# Patient Record
Sex: FEMALE | Race: WHITE | NOT HISPANIC OR LATINO | Employment: OTHER | URBAN - NONMETROPOLITAN AREA
[De-identification: names, ages, dates, MRNs, and addresses within clinical notes are randomized per-mention and may not be internally consistent; named-entity substitution may affect disease eponyms.]

---

## 2024-05-22 ENCOUNTER — TELEPHONE (OUTPATIENT)
Dept: CARDIOLOGY | Facility: CLINIC | Age: 64
End: 2024-05-22

## 2024-05-22 ENCOUNTER — OFFICE VISIT (OUTPATIENT)
Dept: CARDIOLOGY | Facility: CLINIC | Age: 64
End: 2024-05-22
Payer: COMMERCIAL

## 2024-05-22 VITALS
HEIGHT: 70 IN | HEART RATE: 78 BPM | WEIGHT: 228.4 LBS | SYSTOLIC BLOOD PRESSURE: 108 MMHG | BODY MASS INDEX: 32.7 KG/M2 | DIASTOLIC BLOOD PRESSURE: 72 MMHG

## 2024-05-22 DIAGNOSIS — I10 ESSENTIAL HYPERTENSION: ICD-10-CM

## 2024-05-22 DIAGNOSIS — R07.89 OTHER CHEST PAIN: ICD-10-CM

## 2024-05-22 DIAGNOSIS — R53.83 OTHER FATIGUE: Primary | ICD-10-CM

## 2024-05-22 DIAGNOSIS — R06.02 SHORTNESS OF BREATH: ICD-10-CM

## 2024-05-22 PROBLEM — R07.9 CHEST PAIN: Status: ACTIVE | Noted: 2024-05-22

## 2024-05-22 PROCEDURE — 3074F SYST BP LT 130 MM HG: CPT | Performed by: NURSE PRACTITIONER

## 2024-05-22 PROCEDURE — 1036F TOBACCO NON-USER: CPT | Performed by: NURSE PRACTITIONER

## 2024-05-22 PROCEDURE — 3008F BODY MASS INDEX DOCD: CPT | Performed by: NURSE PRACTITIONER

## 2024-05-22 PROCEDURE — 3078F DIAST BP <80 MM HG: CPT | Performed by: NURSE PRACTITIONER

## 2024-05-22 PROCEDURE — 99213 OFFICE O/P EST LOW 20 MIN: CPT | Performed by: NURSE PRACTITIONER

## 2024-05-22 RX ORDER — ESTRADIOL 1 MG/1
1 TABLET ORAL
COMMUNITY

## 2024-05-22 RX ORDER — OMEPRAZOLE 40 MG/1
40 CAPSULE, DELAYED RELEASE ORAL
COMMUNITY

## 2024-05-22 RX ORDER — OLMESARTAN MEDOXOMIL 20 MG/1
20 TABLET ORAL DAILY
COMMUNITY

## 2024-05-22 RX ORDER — CITALOPRAM 10 MG/1
10 TABLET ORAL DAILY
COMMUNITY

## 2024-05-22 RX ORDER — ATENOLOL 100 MG/1
100 TABLET ORAL DAILY
Qty: 1 TABLET | Refills: 0 | Status: SHIPPED | OUTPATIENT
Start: 2024-05-22 | End: 2024-05-23

## 2024-05-22 RX ORDER — ALBUTEROL SULFATE 90 UG/1
2 AEROSOL, METERED RESPIRATORY (INHALATION) EVERY 4 HOURS PRN
COMMUNITY
Start: 2023-10-10

## 2024-05-22 RX ORDER — BISMUTH SUBSALICYLATE 262 MG
1 TABLET,CHEWABLE ORAL DAILY
COMMUNITY

## 2024-05-22 RX ORDER — OXYBUTYNIN CHLORIDE 5 MG/1
5 TABLET ORAL DAILY
COMMUNITY
Start: 2022-09-15

## 2024-05-22 RX ORDER — ACETAMINOPHEN 500 MG/1
500 CAPSULE, LIQUID FILLED ORAL EVERY 4 HOURS PRN
COMMUNITY

## 2024-05-22 RX ORDER — HYDROCHLOROTHIAZIDE 25 MG/1
25 TABLET ORAL
COMMUNITY

## 2024-05-22 RX ORDER — LEVOTHYROXINE SODIUM 88 UG/1
88 CAPSULE ORAL DAILY
COMMUNITY

## 2024-05-22 ASSESSMENT — ENCOUNTER SYMPTOMS
IRREGULAR HEARTBEAT: 0
PND: 0
SYNCOPE: 0
NEAR-SYNCOPE: 0
ORTHOPNEA: 0
DYSPNEA ON EXERTION: 1
DIAPHORESIS: 1
PALPITATIONS: 0

## 2024-05-22 NOTE — PATIENT INSTRUCTIONS
Please bring all medicines, vitamins, and herbal supplements with you when you come to the office.    Prescriptions will not be filled unless you are compliant with your follow up appointments or have a follow up appointment scheduled as per instruction of your physician. Refills should be requested at the time of your visit.     PLAN:   Through informed decision making process incorporating patients unique circumstances, the following treatment plan will be initiated:    1.  Prescription drug management of cardiovascular medication for efficacy, adherence to treatment, side effect assessment and polypharmacy. Current treatment clinically warranted and to continue without modifications.    2. Cardiac CTA (chest pain, dyspnea,fatigue)  Take atenolol 100mg the morning of procedure    3. Return for follow-up; in the interim, contact the office if new symptoms arise.  Dr. Jean-Baptiste annual

## 2024-05-22 NOTE — TELEPHONE ENCOUNTER
[id number] APPROVED AUTH# [id number] FOR [facility], VALID 5/22--7/6/24 PER [employer]. SCHEDULED AT  [facility] ON 6/18/24 AT 1:40PM ARRIVAL TIME, I SPOKE WITH PT AND GAVE HER APPT INFO

## 2024-05-22 NOTE — PROGRESS NOTES
"Chief Complaint  \"I am getting her to breath and fatigued\"    Reason for Visit  Add-on  Patient presents to the office today for outpatient follow-up for concerns regarding change in functional capacity.  Last evaluated in clinic by  Jan 2023.     Presents today ambulatory with steady gait.  Accompanied by patient  Patient denies any hospitalizations or significant changes to interval medical history since last office follow-up.     History of Present Illness   Patient is a pleasant 63-year-old who presents the office today with concerns of progressive worsening shortness of breath, exertional diaphoresis and fatigability.    She was initially seen by Dr. Astorga in the Fall of 2022 hospitalization where she ruled out for acute coronary syndrome or acute MI.  In November 2022 perfusion study showed no ischemia, and echocardiogram showed LVEF of 65% with no significant valvular heart disease and RVSP of 26 mmHg.  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  She denies any additional cardiology follow-up.    She presents to the office today where she reports progressive dyspnea on exertion that has been going on probably about 3 months.  She reports fatigability, inability to complete her housework and yard work like she used to.  Describes exertional diaphoresis and episodes of heaviness in her chest.  She reports this changes really unusual for her.  Reports prior pulmonary workup was unremarkable.  Is compliant with CPAP treatment.    Cardiovascular risk profile:  Treated hypertension  Recent lipids with cholesterol 225 and HDL is 70.  Currently on no statin.  Denies diabetes  Non-smoker  Negative family history premature coronary artery disease    Patient reports that overall has no complaint(s) of claudication, irregular heart beat, lower extremity edema, near-syncope, and orthopnea    Discussed concerns regarding dyspnea on exertion, exertional diaphoresis chest pain and " "significant change in overall exercise capacity and functional tolerance in regards to ischemic heart disease.  Discussed testing options of perfusion study, cardiac CTA.  She questions possibility of cardiac catheterization - reviewed  threshold for invasive evaluation and at this time feel she would be best served with cardiac CTA to clearly delineate coronary anatomy.     She reports being told after her WPW ablation to avoid beta-blocker and calcium channel blockers.  No true allergy.    Review of Systems   Constitutional: Positive for diaphoresis.   Cardiovascular:  Positive for chest pain and dyspnea on exertion. Negative for irregular heartbeat, leg swelling, near-syncope, orthopnea, palpitations, paroxysmal nocturnal dyspnea and syncope.        Visit Vitals  /72 (BP Location: Left arm, Patient Position: Sitting)   Pulse 78   Ht 1.778 m (5' 10\")   Wt 104 kg (228 lb 6.4 oz)   BMI 32.77 kg/m²   Smoking Status Never   BSA 2.27 m²     Physical Exam  Vitals and nursing note reviewed.   HENT:      Head: Normocephalic.   Cardiovascular:      Rate and Rhythm: Normal rate and regular rhythm.      Heart sounds: Normal heart sounds.   Pulmonary:      Effort: Pulmonary effort is normal.      Breath sounds: Normal breath sounds.   Abdominal:      Palpations: Abdomen is soft.   Musculoskeletal:      Right lower leg: No edema.      Left lower leg: No edema.   Skin:     General: Skin is warm and dry.   Neurological:      General: No focal deficit present.      Mental Status: She is alert.   Psychiatric:         Mood and Affect: Mood normal.         Behavior: Behavior normal.        Allergies   Allergen Reactions    Carisoprodol Hives, Rash and Unknown    Beta-Blockers (Beta-Adrenergic Blocking Agts) Other    Calcium Channel Blocking Agent Diltiazem Analogues Other    Digoxin Other     Current Outpatient Medications   Medication Instructions    acetaminophen (TYLENOL) 500 mg, oral, Every 4 hours PRN    albuterol 90 " mcg/actuation inhaler 2 puffs, inhalation, Every 4 hours PRN    atenolol (TENORMIN) 100 mg, oral, Daily    citalopram (CELEXA) 10 mg, oral, Daily    estradiol (ESTRACE) 1 mg, oral, Daily before breakfast    hydroCHLOROthiazide (HYDRODIURIL) 25 mg, oral    levothyroxine (TIROSINT) 88 mcg, oral, Daily, Take on an empty stomach at the same time each day, either 30 to 60 minutes prior to breakfast    multivitamin tablet 1 tablet, oral, Daily    olmesartan (BENICAR) 20 mg, oral, Daily    omeprazole (PRILOSEC) 40 mg, oral, 2 times daily before meals    oxybutynin (DITROPAN) 5 mg, oral, Daily      Assessment:    A pleasant 63-year-old female with prior unremarkable cardiovascular workup November 2022 presents with an approximate 3-month history of significant change in exercise capacity and functional functional tolerance with associated dyspnea on exertion, exertional diaphoresis and occasional episodes of chest heaviness.  No evidence of unstable angina symptoms.    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 Jean-Baptiste.  Will arrange follow-up in 1 year, she will return to clinic sooner for abnormal testing.  Her ACC/AHA 10-year risk at  4.1% (no statin indication)    Plan:     Through informed decision making process incorporating patients unique circumstances, the following treatment plan will be initiated:    1.  Prescription drug management of cardiovascular medication for efficacy, adherence to treatment, side effect assessment and polypharmacy. Current treatment clinically warranted and to continue without modifications.    2. Cardiac CTA (chest pain, dyspnea,fatigue)  Take atenolol 100mg the morning of procedure    3. Return for follow-up; in the interim, contact the office if new symptoms arise.  Dr. Jean-Baptiste annual     Jemma Malave  MSN, APRN-CNP, PMHNP-BC  [facility]    Please excuse any errors in grammar or translation related to this dictation. Voice recognition software was  utilized to prepare this document.

## 2024-05-22 NOTE — TELEPHONE ENCOUNTER
Patient phoned inquiring about recent testing ordered, CTA was discussed with Atenolol use. CT cardiac calcium score was ordered and sent to scheduling. Should it be CTA or CT calcium score. please advise.     To Jemma Guo NP for review.

## 2024-05-22 NOTE — LETTER
"May 22, 2024     Vijay Moses DO  [address] [facility] [address]    Patient: Jemma Bueno   YOB: 1960   Date of Visit: 5/22/2024       Dear Dr. Vijay Moses DO:    Thank you for referring Jemma Bueno to me for evaluation. Below are my notes for this consultation.  If you have questions, please do not hesitate to call me. I look forward to following your patient along with you.       Sincerely,     Jemma Malave, APRN-CNP      CC: No Recipients  ______________________________________________________________________________________    Chief Complaint  \"I am getting her to breath and fatigued\"    Reason for Visit  Add-on  Patient presents to the office today for outpatient follow-up for concerns regarding change in functional capacity.  Last evaluated in clinic by  Jan 2023.     Presents today ambulatory with steady gait.  Accompanied by patient  Patient denies any hospitalizations or significant changes to interval medical history since last office follow-up.     History of Present Illness   Patient is a pleasant 63-year-old who presents the office today with concerns of progressive worsening shortness of breath, exertional diaphoresis and fatigability.    She was initially seen by Dr. Astorga in the Fall of 2022 hospitalization where she ruled out for acute coronary syndrome or acute MI.  In November 2022 perfusion study showed no ischemia, and echocardiogram showed LVEF of 65% with no significant valvular heart disease and RVSP of 26 mmHg.  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  She denies any additional cardiology follow-up.    She presents to the office today where she reports progressive dyspnea on exertion that has been going on probably about 3 months.  She reports fatigability, inability to complete her housework and yard work like she used to.  Describes exertional diaphoresis and episodes of heaviness in her chest.  She reports " "this changes really unusual for her.  Reports prior pulmonary workup was unremarkable.  Is compliant with CPAP treatment.    Cardiovascular risk profile:  Treated hypertension  Recent lipids with cholesterol 225 and HDL is 70.  Currently on no statin.  Denies diabetes  Non-smoker  Negative family history premature coronary artery disease    Patient reports that overall has no complaint(s) of claudication, irregular heart beat, lower extremity edema, near-syncope, and orthopnea    Discussed concerns regarding dyspnea on exertion, exertional diaphoresis chest pain and significant change in overall exercise capacity and functional tolerance in regards to ischemic heart disease.  Discussed testing options of perfusion study, cardiac CTA.  She questions possibility of cardiac catheterization - reviewed  threshold for invasive evaluation and at this time feel she would be best served with cardiac CTA to clearly delineate coronary anatomy.     She reports being told after her WPW ablation to avoid beta-blocker and calcium channel blockers.  No true allergy.    Review of Systems   Constitutional: Positive for diaphoresis.   Cardiovascular:  Positive for chest pain and dyspnea on exertion. Negative for irregular heartbeat, leg swelling, near-syncope, orthopnea, palpitations, paroxysmal nocturnal dyspnea and syncope.        Visit Vitals  /72 (BP Location: Left arm, Patient Position: Sitting)   Pulse 78   Ht 1.778 m (5' 10\")   Wt 104 kg (228 lb 6.4 oz)   BMI 32.77 kg/m²   Smoking Status Never   BSA 2.27 m²     Physical Exam  Vitals and nursing note reviewed.   HENT:      Head: Normocephalic.   Cardiovascular:      Rate and Rhythm: Normal rate and regular rhythm.      Heart sounds: Normal heart sounds.   Pulmonary:      Effort: Pulmonary effort is normal.      Breath sounds: Normal breath sounds.   Abdominal:      Palpations: Abdomen is soft.   Musculoskeletal:      Right lower leg: No edema.      Left lower leg: No " edema.   Skin:     General: Skin is warm and dry.   Neurological:      General: No focal deficit present.      Mental Status: She is alert.   Psychiatric:         Mood and Affect: Mood normal.         Behavior: Behavior normal.        Allergies   Allergen Reactions   • Carisoprodol Hives, Rash and Unknown   • Beta-Blockers (Beta-Adrenergic Blocking Agts) Other   • Calcium Channel Blocking Agent Diltiazem Analogues Other   • Digoxin Other     Current Outpatient Medications   Medication Instructions   • acetaminophen (TYLENOL) 500 mg, oral, Every 4 hours PRN   • albuterol 90 mcg/actuation inhaler 2 puffs, inhalation, Every 4 hours PRN   • atenolol (TENORMIN) 100 mg, oral, Daily   • citalopram (CELEXA) 10 mg, oral, Daily   • estradiol (ESTRACE) 1 mg, oral, Daily before breakfast   • hydroCHLOROthiazide (HYDRODIURIL) 25 mg, oral   • levothyroxine (TIROSINT) 88 mcg, oral, Daily, Take on an empty stomach at the same time each day, either 30 to 60 minutes prior to breakfast   • multivitamin tablet 1 tablet, oral, Daily   • olmesartan (BENICAR) 20 mg, oral, Daily   • omeprazole (PRILOSEC) 40 mg, oral, 2 times daily before meals   • oxybutynin (DITROPAN) 5 mg, oral, Daily      Assessment:    A pleasant 63-year-old female with prior unremarkable cardiovascular workup November 2022 presents with an approximate 3-month history of significant change in exercise capacity and functional functional tolerance with associated dyspnea on exertion, exertional diaphoresis and occasional episodes of chest heaviness.  No evidence of unstable angina symptoms.    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  Will arrange follow-up in 1 year, she will return to clinic sooner for abnormal testing.  Her ACC/AHA 10-year risk at  4.1% (no statin indication)    Plan:     Through informed decision making process incorporating patients unique circumstances, the following treatment plan will be initiated:    1.  Prescription  drug management of cardiovascular medication for efficacy, adherence to treatment, side effect assessment and polypharmacy. Current treatment clinically warranted and to continue without modifications.    2. Cardiac CTA (chest pain, dyspnea,fatigue)  Take atenolol 100mg the morning of procedure    3. Return for follow-up; in the interim, contact the office if new symptoms arise.  Dr. Jerilyn Malave  MSN, APRN-CNP, PMHNP-BC  [facility]    Please excuse any errors in grammar or translation related to this dictation. Voice recognition software was utilized to prepare this document.

## 2024-05-24 ENCOUNTER — HOSPITAL ENCOUNTER (OUTPATIENT)
Dept: RADIOLOGY | Facility: CLINIC | Age: 64
Discharge: HOME | End: 2024-05-24
Payer: COMMERCIAL

## 2024-05-24 VITALS
WEIGHT: 225 LBS | HEART RATE: 52 BPM | RESPIRATION RATE: 16 BRPM | BODY MASS INDEX: 32.21 KG/M2 | OXYGEN SATURATION: 100 % | DIASTOLIC BLOOD PRESSURE: 69 MMHG | SYSTOLIC BLOOD PRESSURE: 119 MMHG | HEIGHT: 70 IN

## 2024-05-24 DIAGNOSIS — R53.83 OTHER FATIGUE: ICD-10-CM

## 2024-05-24 DIAGNOSIS — R06.02 SHORTNESS OF BREATH: ICD-10-CM

## 2024-05-24 DIAGNOSIS — R93.1 ABNORMAL FINDINGS ON DIAGNOSTIC IMAGING OF HEART AND CORONARY CIRCULATION: ICD-10-CM

## 2024-05-24 DIAGNOSIS — R07.89 OTHER CHEST PAIN: ICD-10-CM

## 2024-05-24 LAB
CREAT SERPL-MCNC: 1.2 MG/DL (ref 0.6–1.3)
GFR SERPL CREATININE-BSD FRML MDRD: 51 ML/MIN/1.73M*2

## 2024-05-24 PROCEDURE — 2550000001 HC RX 255 CONTRASTS: Performed by: NURSE PRACTITIONER

## 2024-05-24 PROCEDURE — 75574 CT ANGIO HRT W/3D IMAGE: CPT

## 2024-05-24 PROCEDURE — 75580 N-INVAS EST C FFR SW ALY CTA: CPT

## 2024-05-24 PROCEDURE — 2500000001 HC RX 250 WO HCPCS SELF ADMINISTERED DRUGS (ALT 637 FOR MEDICARE OP): Performed by: INTERNAL MEDICINE

## 2024-05-24 PROCEDURE — 82565 ASSAY OF CREATININE: CPT

## 2024-05-24 RX ORDER — NITROGLYCERIN 400 UG/1
2 SPRAY ORAL ONCE
Status: COMPLETED | OUTPATIENT
Start: 2024-05-24 | End: 2024-05-24

## 2024-05-24 RX ADMIN — NITROGLYCERIN 2 SPRAY: 400 SPRAY ORAL at 12:57

## 2024-05-24 RX ADMIN — IOHEXOL 80 ML: 350 INJECTION, SOLUTION INTRAVENOUS at 13:41

## 2024-05-24 NOTE — NURSING NOTE
Post CCTA pt denies feeling dizzy or lightheaded, denies headache. Steady on feet, skin warm pink and dry. Pt verbalized understanding of increased water intake x24 hours, educated on side effects of medications. HL removed with tip intact, manual pressure held until hemostasis achieved, 2x2 and coban to site. Pt DC home to self care with her .

## 2024-05-28 ENCOUNTER — TELEPHONE (OUTPATIENT)
Dept: CARDIOLOGY | Facility: CLINIC | Age: 64
End: 2024-05-28
Payer: COMMERCIAL

## 2024-05-28 DIAGNOSIS — R93.89 ABNORMAL COMPUTED TOMOGRAPHY ANGIOGRAPHY (CTA): ICD-10-CM

## 2024-05-28 DIAGNOSIS — R53.83 OTHER FATIGUE: ICD-10-CM

## 2024-05-28 DIAGNOSIS — R06.02 SHORTNESS OF BREATH: ICD-10-CM

## 2024-05-28 DIAGNOSIS — R07.89 OTHER CHEST PAIN: ICD-10-CM

## 2024-05-28 NOTE — TELEPHONE ENCOUNTER
Result Communication    Resulted Orders   CT angio coronary art with heartflow if score >30%    Addendum: 5/24/2024    Interpreted By:  Elijah Logan,   ADDENDUM:  Please disregard the original dictation as data was from a different  patient.      CT Dose-Length Product (DLP): 1086.6 mGy/cm  CT Dose Reduction Employed: Yes iterative reconstruction          FINDINGS:  The left main is normal sized vessel that  bifurcates into the LAD  and circumflex.      There is no significant atherosclerotic change or stenotic disease.      LEFT ANTERIOR DESCENDING ARTERY:  The LAD is a normal size vessel that  wraps around the apex.  Proximal noncalcified plaque without significant stenosis. Mid LAD  noncalcified plaque with 50% stenosis. 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 The length of  myocardial bridging is 27 mm. The depth of myocardial bridging is 10  mm. The LAD dips into the myocardium in the v-shaped pattern. There  is distal noncalcified plaque with 50-70% stenosis. Mid LAD FFR 0.90,  0.86. Mid LAD stenosis at site of myocardial bridging  does not  appear to be hemodynamic significant by FFR assessment. There is  distal noncalcified plaque with 50-70% stenosis. Distal FFR 0.72  suggesting hemodynamically significant lesion. The distal LAD is  small in size. LAD gives rise to 1 acute diagonal branches.  D1: Large branching D1. Proximal/mid noncalcified plaque without  significant stenosis.          LEFT CIRCUMFLEX ARTERY:  The LCfx is a normal size vessel, which is  non-dominant.  Normal.  LCfx gives rise to 2 obtuse marginal branches.  OM1 normal.  OM2 normal.      There is no significant atherosclerotic change or stenotic disease.      RIGHT CORONARY ARTERY:  The RCA is a normal size vessel, which is  dominant .      It gives rise to a conus branch, vielka branch, and 2 acute marginal  branches.  In its distal segment it bifurcates into the PDA and PV  branch.       Proximal noncalcified plaque without significant stenosis.      Right PDA mid vessel noncalcified plaque without significant stenosis.  Right PLV normal.      Coronary artery calcium score 0.      CARDIAC CHAMBERS:  The cardiac chambers demonstrate normal atrioventricular and  ventriculoarterial concordance, and systemic and pulmonary venous  return.      LEFT VENTRICLE:  Normal size End diastolic volume 147 ml, 65 ml/m2      LEFT VENTRICLE MASS: 139 gm, 61 gm/m2      RIGHT VENTRICLE:  Normal size End diastolic volume 184 ml, 81 ml/m2      LEFT ATRIUM:  Normal size End systolic volume 116 ml, 51 ml/m2      RIGHT ATRIUM:  Normal size End systolic volume 134 ml, 59 ml/m2      INTERATRIAL SEPTUM:  Intact.      AORTIC VALVE:  The aortic valve is  trileaflet in morphology. No calcifications.      MITRAL VALVE:  No thickening/calcification.      THORACIC AORTA:  The visualized thoracic aorta is normal in course, caliber, and  contour.      There is no acute aortic pathology, such as dissection, intramural  hematoma, or contained rupture.      The aortic arch is not included on this examination.      PERICARDIUM:  There is no pericardial effusion of thickening.      Fractional flow reserve  Lad: Proximal 0.95, mid 0.90, 0.86, distal 0.72  D1: Proximal 0.94, mid 0.91, distal 0.85      Left circumflex: Proximal 0.98, mid 0.97  OM1 proximal 0.95, mid 0.90  OM2 proximal 0.96, mid 0.91      RCA: Proximal 0.98, mid 0.97, distal 0.97  PDA: Proximal 0.97, mid 0.96  PLV: Proximal 0.97, mid 0.96      IMPRESSION:  1.  LEFT ANTERIOR DESCENDING ARTERY:  The LAD is a normal size vessel that  wraps around the apex.  Proximal noncalcified plaque without significant stenosis. Mid LAD  noncalcified plaque with 50% stenosis. 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 The length of  myocardial bridging is 27 mm. The depth of myocardial bridging is 10  mm. The LAD dips into the myocardium in the  v-shaped pattern. There  is distal noncalcified plaque with 50-70% stenosis. Mid LAD FFR 0.90,  0.86. Mid LAD stenosis at site of myocardial bridging  does not  appear to be hemodynamic significant by FFR assessment. There is  distal noncalcified plaque with 50-70% stenosis. Distal FFR 0.72  suggesting hemodynamically significant lesion. The distal LAD is  small in size.      2. Mild diffuse coronary artery disease of the remaining coronary  arteries without significant stenosis.  3. No hemodynamic significant stenosis of the remaining major  epicardial coronary arteries by FFR assessment.  4. Coronary artery calcium score 0.      Signed by: Elijah Logan 5/24/2024 4:13 PM      -------- ORIGINAL REPORT --------  Dictation workstation:   [id number]      Addendum: 5/24/2024    Interpreted By:  Gama Ryan,   ADDENDUM:  NON-CARDIOVASCULAR FINDINGS      INCLUDED LUNGS, AIRWAYS AND PLEURA  Endotracheal / endobronchial lesion: Negative  Nodule: Negative  Airspace disease: Negative  Pleural effusion: Negative  Pneumothorax: Negative      Other: No acute or contributory unanticipated findings      INCLUDED NON-CARDIOVASCULAR LEIF AND MEDIASTINUM  Adenopathy: Negative  Included esophagus: Unremarkable      Other: No acute or contributory unanticipated findings      INCLUDED BONES: No acute skeletal findings, noting less sensitivity  and specificity without dedicated sagittal and coronal reformatted  series.      INCLUDED CHEST WALL  No acute or contributory unanticipated findings      INCLUDED UPPER ABDOMEN  No acute or contributory unanticipated findings      -------      NON-CARDIOVASCULAR IMPRESSION      NO ACUTE OR CONTRIBUTORY UNEXPECTED FINDINGS OF THE INCLUDED  NON-CARDIOVASCULAR STRUCTURES      NOTE THIS ADDENDUM IS SOLELY FOR INTERPRETATION OF ANATOMY OUTSIDE  THE CARDIOVASCULAR SYSTEM. INTERPRETATION OF AND REPORTING OF THE  CARDIOVASCULAR STRUCTURES ARE THE SOLE RESPONSIBILITY OF THE  CARDIOLOGIST SUBMITTING  THE ORIGINAL REPORT (NOT THIS ADDENDUM)      Signed by: Gama Connor 5/24/2024 3:22 PM      -------- ORIGINAL REPORT --------  Dictation workstation:   [id number]      Narrative    Interpreted By:  Elijah Logan,   STUDY:  CT ANGIO CORONARY ART WITH HEARTFLOW IF SCORE >30%;  5/24/2024 1:41 pm      INDICATION:  Signs/Symptoms:chest pain, dyspnea.      COMPARISON:  None.      ACCESSION NUMBER(S):  [id number]      ORDERING CLINICIAN:  FANTASMA GALVEZ      TECHNIQUE:  Using multi-detector CT technology, Franny 64-slice scanner, axial,  sequential imaging with retrospective gating was performed of the  chest following the intravenous administration of contrast material.  A low-osmolar contrast agent was used 75 ml of Omnipaque 350. Using  prospective ECG gating, CT scan of the coronary arteries was  performed without intravenous contrast. Coronary calcium scoring was  performed according to the method of Agatston.      The patient was premedicated with atenolol and 0.4 mg sublingual  nitroglycerin per protocol for heart rate control and coronary  dilation, respectively.      For optimization of anatomic evaluation, multiplanar reconstruction,  maximum intensity projections, and advanced 3-D off-line  postprocessing were performed on a dedicated stand-alone workstation  under the direct supervision of the interpreting physician.      CT Dose-Length Product (DLP):  761.5 mGy/cm  CT Dose Reduction Employed: Yes iterative reconstruction          FINDINGS:  The left main is normal sized vessel that  bifurcates into the LAD  and circumflex.      There is no significant atherosclerotic change or stenotic disease.      LEFT ANTERIOR DESCENDING ARTERY:  The LAD is a normal size vessel that  wraps around the apex.  Normal.  LAD gives rise to 2 acute diagonal branches.  D1: Normal.  D2: Normal.      There is no significant atherosclerotic change or stenotic disease.      Ramus: Normal.      LEFT CIRCUMFLEX ARTERY:  The LCfx is a  normal size vessel, which is  non-dominant.  Normal.  LCfx gives rise to 1 obtuse marginal branches.  OM1 normal.      There is no significant atherosclerotic change or stenotic disease.      RIGHT CORONARY ARTERY:  The RCA is a normal size vessel, which is  dominant .      It gives rise to a conus branch, vielka branch, and 2 acute marginal  branches.  In its distal segment it bifurcates into the PDA and PV  branch.      There is no significant atherosclerotic change or stenotic disease.      Right PDA normal.  Right PLV normal.      Coronary artery calcium score 0.      CARDIAC CHAMBERS:  The cardiac chambers demonstrate normal atrioventricular and  ventriculoarterial concordance, and systemic and pulmonary venous  return.      LEFT VENTRICLE:  Normal size End diastolic volume 103 ml, 70 ml/m2      LEFT VENTRICLE MASS: 104 gm, 71 gm/m2      RIGHT VENTRICLE:  Normal size End diastolic volume 125 ml, 85 ml/m2      LEFT ATRIUM:  Normal size End systolic volume 96 ml, 65 ml/m2      RIGHT ATRIUM:  Normal size End systolic volume 105 ml, 71 ml/m2      INTERATRIAL SEPTUM:  Intact.      AORTIC VALVE:  The aortic valve is  trileaflet in morphology. No calcifications.      MITRAL VALVE:  No thickening/calcification.      THORACIC AORTA:  The visualized thoracic aorta is normal in course, caliber, and  contour.      There is no acute aortic pathology, such as dissection, intramural  hematoma, or contained rupture.      The aortic arch is not included on this examination.      PERICARDIUM:  There is no pericardial effusion of thickening.        Impression    1.  Normal coronary anatomy without evidence of atherosclerotic  changes or stenotic disease.  2. Coronary artery calcium score 0.      Reading Cardiologist: Dr. Elijah Logan, Date:  5/24/2024  2:35 pm      Signed by: Elijah Logan 5/24/2024 2:37 PM  Dictation workstation:   [id number]       11:01 AM

## 2024-05-28 NOTE — TELEPHONE ENCOUNTER
----- Message from ELVIRA Vanessa sent at 5/28/2024  9:43 AM EDT -----  Please let her know that cCTA show disease throughout the LAD;  there is some concern for possible significant stenosis distally (vessel is very small).  At that time due to progressive symptoms and abnormal cCTA she should be referred for cardiac cath with Dr. Bland.  Please add ASA 81mg daily.   ----- Message -----  From: Interface, Radiology Results In  Sent: 5/24/2024   3:07 PM EDT  To: ELVIRA Vanessa

## 2024-05-28 NOTE — TELEPHONE ENCOUNTER
Patient phoned orders discussed verbalized understanding. Denies having allergies to dye/shellfish, not diabetic. Orders placed in chart.  Once signed Clerical to schedule .    Spoke to Jemma . Imdur 30mg daily initiated. Rx sent to [facility]. To scheduling.

## 2024-05-29 ENCOUNTER — TELEPHONE (OUTPATIENT)
Dept: CARDIOLOGY | Facility: CLINIC | Age: 64
End: 2024-05-29
Payer: COMMERCIAL

## 2024-05-29 DIAGNOSIS — R07.89 OTHER CHEST PAIN: Primary | ICD-10-CM

## 2024-05-29 RX ORDER — ISOSORBIDE MONONITRATE 30 MG/1
30 TABLET, EXTENDED RELEASE ORAL DAILY
Qty: 30 TABLET | Refills: 11 | Status: SHIPPED | OUTPATIENT
Start: 2024-05-29 | End: 2025-05-29

## 2024-05-29 NOTE — TELEPHONE ENCOUNTER
HEART CATH/[id number] DX-R93.89, R07.89, R53.83, R06.02 NO AUTH REQ PER UNIQUE C. AT [facility] MUTUAL CALL REF# [id number]

## 2024-06-18 ENCOUNTER — APPOINTMENT (OUTPATIENT)
Dept: RADIOLOGY | Facility: CLINIC | Age: 64
End: 2024-06-18
Payer: COMMERCIAL

## 2024-06-23 ENCOUNTER — APPOINTMENT (OUTPATIENT)
Dept: RADIOLOGY | Facility: CLINIC | Age: 64
End: 2024-06-23
Payer: COMMERCIAL

## 2024-06-26 ENCOUNTER — APPOINTMENT (OUTPATIENT)
Dept: CARDIOLOGY | Facility: CLINIC | Age: 64
End: 2024-06-26
Payer: COMMERCIAL

## 2024-06-26 VITALS
HEIGHT: 70 IN | HEART RATE: 64 BPM | WEIGHT: 227 LBS | DIASTOLIC BLOOD PRESSURE: 88 MMHG | BODY MASS INDEX: 32.5 KG/M2 | SYSTOLIC BLOOD PRESSURE: 120 MMHG

## 2024-06-26 DIAGNOSIS — I10 ESSENTIAL HYPERTENSION: ICD-10-CM

## 2024-06-26 DIAGNOSIS — E78.2 MIXED HYPERLIPIDEMIA: ICD-10-CM

## 2024-06-26 DIAGNOSIS — I25.10 CORONARY ARTERY DISEASE INVOLVING NATIVE CORONARY ARTERY OF NATIVE HEART WITHOUT ANGINA PECTORIS: Primary | ICD-10-CM

## 2024-06-26 PROCEDURE — 99213 OFFICE O/P EST LOW 20 MIN: CPT | Performed by: NURSE PRACTITIONER

## 2024-06-26 PROCEDURE — 3079F DIAST BP 80-89 MM HG: CPT | Performed by: NURSE PRACTITIONER

## 2024-06-26 PROCEDURE — 3074F SYST BP LT 130 MM HG: CPT | Performed by: NURSE PRACTITIONER

## 2024-06-26 PROCEDURE — 3008F BODY MASS INDEX DOCD: CPT | Performed by: NURSE PRACTITIONER

## 2024-06-26 RX ORDER — ASPIRIN 81 MG/1
81 TABLET ORAL DAILY
COMMUNITY

## 2024-06-26 RX ORDER — ATORVASTATIN CALCIUM 80 MG/1
80 TABLET, FILM COATED ORAL DAILY
COMMUNITY
Start: 2024-05-30

## 2024-06-26 NOTE — LETTER
"June 27, 2024     Vijay Moses DO  [address] [facility] [address]    Patient: Jemma Bueno   YOB: 1960   Date of Visit: 6/26/2024       Dear Dr. Vijay Moses, :    Thank you for referring Jemma Bueno to me for evaluation. Below are my notes for this consultation.  If you have questions, please do not hesitate to call me. I look forward to following your patient along with you.       Sincerely,     Jemma Malave, APRN-CNP      CC: No Recipients  ______________________________________________________________________________________    Chief Complaint  \"I have had a couple spells\"     Reason for Visit  1 month follow up   Patient presents to the office today for outpatient follow-up for cardiovascular procedure  Last evaluated in clinic by myself 2024.    Subsequent cCTA suggestive of hemodynamically significant distal LAD disease with myocardial bridging.  She was schedule for elective cath but presented to [facility] [place] to [place] pain - r/o ACS/AMI.  Cath by Dr. Christina with recommendations for medical management.  She was initiated on atorvastatin 80 mg daily at time of discharge.    Presents today ambulatory with steady gait.  Accompanied by patient  Patient denies any hospitalizations or significant changes to interval medical history since last office follow-up.     History of Present Illness   Patient is a pleasant 60-year-old female who presents to the office today where her right radial cath site is healed without adverse sequela.  She is much relieved with the results of the cardiac catheterization.  She does go on to report having a\" couple spells\" of chest heaviness but nothing as bad as previous.  Over the weekend she [place] with just some fatigability.  She remains compliant with her CPAP machine.  She reports that her overall dyspnea on exertion is simply not as bad.  We did discuss the indication for Ranexa in the setting of " "microvascular disease, at this time she prefers to hold off on additional medications but will notify me of any concerns.    Patient reports that overall has no complaint(s) of claudication, dyspnea, irregular heart beat, lower extremity edema, near-syncope, and orthopnea    The importance of secondary prevention reviewed:  HTN: Optimal  HLD: Started treatment  DM: Denies  Smoker: Denies  BMI:  Reviewed the merits of healthy lifestyle choices on overall cardiovascular health.    Review of Systems   Cardiovascular:  Positive for chest pain. Negative for dyspnea on exertion, irregular heartbeat, leg swelling, near-syncope, orthopnea, palpitations, paroxysmal nocturnal dyspnea and syncope.        Visit Vitals  /88 (BP Location: Left arm, Patient Position: Sitting)   Pulse 64   Ht 1.778 m (5' 10\")   Wt 103 kg (227 lb)   BMI 32.57 kg/m²   Smoking Status Never   BSA 2.26 m²     Physical Exam  Vitals and nursing note reviewed.   HENT:      Head: Normocephalic.   Cardiovascular:      Rate and Rhythm: Normal rate and regular rhythm.      Heart sounds: Normal heart sounds.   Pulmonary:      Effort: Pulmonary effort is normal.      Breath sounds: Normal breath sounds.   Abdominal:      Palpations: Abdomen is soft.   Musculoskeletal:      Right lower leg: No edema.      Left lower leg: No edema.   Skin:     General: Skin is warm and dry.   Neurological:      General: No focal deficit present.      Mental Status: She is alert.   Psychiatric:         Mood and Affect: Mood normal.         Behavior: Behavior normal.      Allergies    Current Outpatient Medications   Medication Instructions   • acetaminophen (TYLENOL) 500 mg, oral, Every 4 hours PRN   • albuterol 90 mcg/actuation inhaler 2 puffs, inhalation, Every 4 hours PRN   • aspirin 81 mg, oral, Daily   • atorvastatin (LIPITOR) 80 mg, oral, Daily   • citalopram (CELEXA) 10 mg, oral, Daily   • estradiol (ESTRACE) 1 mg, oral, Daily before breakfast   • hydroCHLOROthiazide " (HYDRODIURIL) 25 mg, oral   • levothyroxine (TIROSINT) 88 mcg, oral, Daily, Take on an empty stomach at the same time each day, either 30 to 60 minutes prior to breakfast   • multivitamin tablet 1 tablet, oral, Daily   • olmesartan (BENICAR) 20 mg, oral, Daily   • omeprazole (PRILOSEC) 40 mg, oral, 2 times daily before meals   • oxybutynin (DITROPAN) 5 mg, oral, Daily      Assessment:    Coronary artery disease involving native coronary artery of native heart without angina pectoris  May 2024 cardiac cath  mLAD 30%  No significant myocardial bridging as noted on cCTA  CX & RCA luminal irregularities  Microvascular disease  EF 60%  LVEDP 6-8    Mixed hyperlipidemia  Tolerating recent addition of high intensity statin  Will have labs repeated thru PCP    BMI 32.0-32.9,adult  Reviewed the merits of healthy lifestyle choices on overall cardiovascular health.      Essential hypertension  optimal in office    Plan:     Through informed decision making process incorporating patients unique circumstances, the following treatment plan will be initiated:    1.  Prescription drug management of cardiovascular medication for efficacy, adherence to treatment, side effect assessment and polypharmacy. Current treatment clinically warranted and to continue without modifications.    2.  Please let me know if you continue to have dyspnea on exertion or chest pain and could start Ranexa    3. Return for follow-up; in the interim, contact the office if new symptoms arise.  Dr. eJan-Baptiste as scheduled    Jemma Malave  MSN, APRN-CNP, PMHNP-BC  [facility]    Please excuse any errors in grammar or translation related to this dictation. Voice recognition software was utilized to prepare this document.

## 2024-06-26 NOTE — PATIENT INSTRUCTIONS
Please bring all medicines, vitamins, and herbal supplements with you when you come to the office.    Prescriptions will not be filled unless you are compliant with your follow up appointments or have a follow up appointment scheduled as per instruction of your physician. Refills should be requested at the time of your visit.     PLAN:   Through informed decision making process incorporating patients unique circumstances, the following treatment plan will be initiated:    1.  Prescription drug management of cardiovascular medication for efficacy, adherence to treatment, side effect assessment and polypharmacy. Current treatment clinically warranted and to continue without modifications.    2.  Please let me know if you continue to have dyspnea on exertion or chest pain and could start Ranexa    3. Return for follow-up; in the interim, contact the office if new symptoms arise.  Dr. Jean-Baptiste as scheduled

## 2024-06-27 ASSESSMENT — ENCOUNTER SYMPTOMS
DYSPNEA ON EXERTION: 0
ORTHOPNEA: 0
IRREGULAR HEARTBEAT: 0
NEAR-SYNCOPE: 0
PND: 0
PALPITATIONS: 0
SYNCOPE: 0

## 2024-06-27 NOTE — PROGRESS NOTES
"Chief Complaint  \"I have had a couple spells\"     Reason for Visit  1 month follow up   Patient presents to the office today for outpatient follow-up for cardiovascular procedure  Last evaluated in clinic by myself 2024.    Subsequent cCTA suggestive of hemodynamically significant distal LAD disease with myocardial bridging.  She was schedule for elective cath but presented to [facility] due to chest pain - r/o ACS/AMI.  Cath by Dr. Christina with recommendations for medical management.  She was initiated on atorvastatin 80 mg daily at time of discharge.    Presents today ambulatory with steady gait.  Accompanied by patient  Patient denies any hospitalizations or significant changes to interval medical history since last office follow-up.     History of Present Illness   Patient is a pleasant 60-year-old female who presents to the office today where her right radial cath site is healed without adverse sequela.  She is much relieved with the results of the cardiac catheterization.  She does go on to report having a\" couple spells\" of chest heaviness but nothing as bad as previous.  Over the weekend she walked approximately 5 miles at [place] with just some fatigability.  She remains compliant with her CPAP machine.  She reports that her overall dyspnea on exertion is simply not as bad.  We did discuss the indication for Ranexa in the setting of microvascular disease, at this time she prefers to hold off on additional medications but will notify me of any concerns.    Patient reports that overall has no complaint(s) of claudication, dyspnea, irregular heart beat, lower extremity edema, near-syncope, and orthopnea    The importance of secondary prevention reviewed:  HTN: Optimal  HLD: Started treatment  DM: Denies  Smoker: Denies  BMI:  Reviewed the merits of healthy lifestyle choices on overall cardiovascular health.    Review of Systems   Cardiovascular:  Positive for chest pain. Negative for dyspnea on exertion, irregular " "heartbeat, leg swelling, near-syncope, orthopnea, palpitations, paroxysmal nocturnal dyspnea and syncope.        Visit Vitals  /88 (BP Location: Left arm, Patient Position: Sitting)   Pulse 64   Ht 1.778 m (5' 10\")   Wt 103 kg (227 lb)   BMI 32.57 kg/m²   Smoking Status Never   BSA 2.26 m²     Physical Exam  Vitals and nursing note reviewed.   HENT:      Head: Normocephalic.   Cardiovascular:      Rate and Rhythm: Normal rate and regular rhythm.      Heart sounds: Normal heart sounds.   Pulmonary:      Effort: Pulmonary effort is normal.      Breath sounds: Normal breath sounds.   Abdominal:      Palpations: Abdomen is soft.   Musculoskeletal:      Right lower leg: No edema.      Left lower leg: No edema.   Skin:     General: Skin is warm and dry.   Neurological:      General: No focal deficit present.      Mental Status: She is alert.   Psychiatric:         Mood and Affect: Mood normal.         Behavior: Behavior normal.      Allergies    Current Outpatient Medications   Medication Instructions    acetaminophen (TYLENOL) 500 mg, oral, Every 4 hours PRN    albuterol 90 mcg/actuation inhaler 2 puffs, inhalation, Every 4 hours PRN    aspirin 81 mg, oral, Daily    atorvastatin (LIPITOR) 80 mg, oral, Daily    citalopram (CELEXA) 10 mg, oral, Daily    estradiol (ESTRACE) 1 mg, oral, Daily before breakfast    hydroCHLOROthiazide (HYDRODIURIL) 25 mg, oral    levothyroxine (TIROSINT) 88 mcg, oral, Daily, Take on an empty stomach at the same time each day, either 30 to 60 minutes prior to breakfast    multivitamin tablet 1 tablet, oral, Daily    olmesartan (BENICAR) 20 mg, oral, Daily    omeprazole (PRILOSEC) 40 mg, oral, 2 times daily before meals    oxybutynin (DITROPAN) 5 mg, oral, Daily      Assessment:    Coronary artery disease involving native coronary artery of native heart without angina pectoris  May 2024 cardiac cath  mLAD 30%  No significant myocardial bridging as noted on cCTA  CX & RCA luminal " irregularities  Microvascular disease  EF 60%  LVEDP 6-8    Mixed hyperlipidemia  Tolerating recent addition of high intensity statin  Will have labs repeated thru PCP    BMI 32.0-32.9,adult  Reviewed the merits of healthy lifestyle choices on overall cardiovascular health.      Essential hypertension  optimal in office    Plan:     Through informed decision making process incorporating patients unique circumstances, the following treatment plan will be initiated:    1.  Prescription drug management of cardiovascular medication for efficacy, adherence to treatment, side effect assessment and polypharmacy. Current treatment clinically warranted and to continue without modifications.    2.  Please let me know if you continue to have dyspnea on exertion or chest pain and could start Ranexa    3. Return for follow-up; in the interim, contact the office if new symptoms arise.  Dr. Jean-Baptiste as scheduled    Jemma Malave  MSN, APRN-CNP, PMHNP-BC  [facility]    Please excuse any errors in grammar or translation related to this dictation. Voice recognition software was utilized to prepare this document.

## 2024-06-27 NOTE — ASSESSMENT & PLAN NOTE
May 2024 cardiac cath  mLAD 30%  No significant myocardial bridging as noted on cCTA  CX & RCA luminal irregularities  Microvascular disease  EF 60%  LVEDP 6-8

## 2024-10-25 ENCOUNTER — TELEPHONE (OUTPATIENT)
Dept: CARDIOLOGY | Facility: CLINIC | Age: 64
End: 2024-10-25
Payer: COMMERCIAL

## 2024-10-25 DIAGNOSIS — E78.2 MIXED HYPERLIPIDEMIA: ICD-10-CM

## 2024-10-25 DIAGNOSIS — I10 ESSENTIAL HYPERTENSION: ICD-10-CM

## 2024-10-25 DIAGNOSIS — I25.10 CORONARY ARTERY DISEASE INVOLVING NATIVE CORONARY ARTERY OF NATIVE HEART WITHOUT ANGINA PECTORIS: ICD-10-CM

## 2024-10-25 RX ORDER — PRAVASTATIN SODIUM 20 MG/1
20 TABLET ORAL DAILY
Qty: 90 TABLET | Refills: 3 | Status: SHIPPED | OUTPATIENT
Start: 2024-10-25 | End: 2025-10-25

## 2024-10-25 RX ORDER — OLMESARTAN MEDOXOMIL 40 MG/1
40 TABLET ORAL DAILY
Qty: 90 TABLET | Refills: 3 | Status: SHIPPED | OUTPATIENT
Start: 2024-10-25 | End: 2025-10-25

## 2024-10-25 NOTE — TELEPHONE ENCOUNTER
Previous patient of Dr. Stalin Vital MD. Patient was admitted 5/30/24 @ [facility] [place] due to chest heaviness. Cardiac cath performed 5/30. Patient was put on Atorvastatin 80 mg during stay at [facility]. Patient has been managing med through PCP. Dr. Vijay Moses suggested she get in touch with her cardiologist due to joint pain, elevated liver enzymes and elevated A1c since starting Atorvastatin. Dr. Moses changed her dosage to Atorvastatin 80 mg Monday, Wednesday and Friday to alleviate symptoms. Patient has POV 05/2025 but would like to be seen sooner per PCP; hopefully before the end of this year. Patient has seen Jemma Guo NP, last OV 6/26. Please advise.    To Dr. Khloe Jean-Baptiste MD for review  To Clerical for patient to be put on possible wait list.

## 2025-01-02 DIAGNOSIS — I10 ESSENTIAL HYPERTENSION: ICD-10-CM

## 2025-01-02 DIAGNOSIS — E78.2 MIXED HYPERLIPIDEMIA: ICD-10-CM

## 2025-01-02 DIAGNOSIS — I25.10 CORONARY ARTERY DISEASE INVOLVING NATIVE CORONARY ARTERY OF NATIVE HEART WITHOUT ANGINA PECTORIS: ICD-10-CM

## 2025-01-02 RX ORDER — OLMESARTAN MEDOXOMIL 40 MG/1
40 TABLET ORAL DAILY
Qty: 90 TABLET | Refills: 3 | Status: SHIPPED | OUTPATIENT
Start: 2025-01-02 | End: 2025-01-03 | Stop reason: SDUPTHER

## 2025-01-02 RX ORDER — PRAVASTATIN SODIUM 20 MG/1
20 TABLET ORAL DAILY
Qty: 90 TABLET | Refills: 3 | Status: SHIPPED | OUTPATIENT
Start: 2025-01-02 | End: 2025-01-03 | Stop reason: SDUPTHER

## 2025-01-03 DIAGNOSIS — I25.10 CORONARY ARTERY DISEASE INVOLVING NATIVE CORONARY ARTERY OF NATIVE HEART WITHOUT ANGINA PECTORIS: ICD-10-CM

## 2025-01-03 DIAGNOSIS — E78.2 MIXED HYPERLIPIDEMIA: ICD-10-CM

## 2025-01-03 DIAGNOSIS — I10 ESSENTIAL HYPERTENSION: ICD-10-CM

## 2025-01-03 RX ORDER — OLMESARTAN MEDOXOMIL 40 MG/1
40 TABLET ORAL DAILY
Qty: 90 TABLET | Refills: 3 | Status: SHIPPED | OUTPATIENT
Start: 2025-01-03 | End: 2026-01-03

## 2025-01-03 RX ORDER — PRAVASTATIN SODIUM 20 MG/1
20 TABLET ORAL DAILY
Qty: 90 TABLET | Refills: 3 | Status: SHIPPED | OUTPATIENT
Start: 2025-01-03 | End: 2026-01-03

## 2025-01-06 ENCOUNTER — APPOINTMENT (OUTPATIENT)
Dept: CARDIOLOGY | Facility: CLINIC | Age: 65
End: 2025-01-06
Payer: COMMERCIAL

## 2025-01-06 VITALS
WEIGHT: 227 LBS | HEART RATE: 68 BPM | BODY MASS INDEX: 32.5 KG/M2 | DIASTOLIC BLOOD PRESSURE: 78 MMHG | SYSTOLIC BLOOD PRESSURE: 136 MMHG | HEIGHT: 70 IN

## 2025-01-06 DIAGNOSIS — I25.10 CORONARY ARTERY DISEASE INVOLVING NATIVE CORONARY ARTERY OF NATIVE HEART WITHOUT ANGINA PECTORIS: Primary | ICD-10-CM

## 2025-01-06 DIAGNOSIS — I10 ESSENTIAL HYPERTENSION: ICD-10-CM

## 2025-01-06 DIAGNOSIS — E78.2 MIXED HYPERLIPIDEMIA: ICD-10-CM

## 2025-01-06 PROCEDURE — 3075F SYST BP GE 130 - 139MM HG: CPT | Performed by: NURSE PRACTITIONER

## 2025-01-06 PROCEDURE — 99213 OFFICE O/P EST LOW 20 MIN: CPT | Performed by: NURSE PRACTITIONER

## 2025-01-06 PROCEDURE — 1036F TOBACCO NON-USER: CPT | Performed by: NURSE PRACTITIONER

## 2025-01-06 PROCEDURE — 3008F BODY MASS INDEX DOCD: CPT | Performed by: NURSE PRACTITIONER

## 2025-01-06 PROCEDURE — 3078F DIAST BP <80 MM HG: CPT | Performed by: NURSE PRACTITIONER

## 2025-01-06 ASSESSMENT — ENCOUNTER SYMPTOMS
IRREGULAR HEARTBEAT: 0
PND: 0
SYNCOPE: 0
DYSPNEA ON EXERTION: 0
NEAR-SYNCOPE: 0
ORTHOPNEA: 0
PALPITATIONS: 0

## 2025-01-06 NOTE — PROGRESS NOTES
"Chief Complaint  \" I cannot take 80 mg of Lipitor\"    Reason for Visit  Patient presents to the office today for outpatient follow-up for medication change.  Last evaluated in clinic by myself June 2024.  Later that month, she called into the office with concerns regarding elevated LFTs, myalgia and arthralgia on Lipitor 80 mg.  She was transitioned over to Pravachol 20 mg daily and is tolerating without concerns.  Recent liver enzymes reviewed and have normalized, she also reports recent lipid profile completed and will need to obtain.    Presents today ambulatory with steady gait.  Accompanied by patient  Patient denies any hospitalizations or significant changes to interval medical history since last office follow-up.   December 2024 seen in local emergency department due to COVID-positive illness; recovered without adverse sequela.    History of Present Illness   Patient is an extremely pleasant 64-year-old female who presents to the office today with no voiced cardiovascular complaints.  Her prior complaints of myalgia and arthralgia have resolved off of Lipitor 80 mg daily.  Will need to obtain lab work.  She remains aerobically active where she cares for her grandson and walks him approximately 1 mile and a daily basis.  She denies any exertional symptoms.    Patient reports that overall has no complaint(s) of chest pain, chest pressure/discomfort, claudication, dyspnea, exertional chest pressure/discomfort, fatigue, and irregular heart beat    Daily activity:    Greater than 4 METS  Denies any change in exercise capacity or functional tolerance since last office visit.    The importance of primary prevention reviewed:  HTN: Borderline in the office  HLD: Treated, need to review labs  DM: Denies  Smoker: Denies  BMI:  Reviewed the merits of healthy lifestyle choices on overall cardiovascular health.    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    Review of Systems   Cardiovascular:  Negative for chest pain, dyspnea on exertion, irregular heartbeat, leg swelling, near-syncope, orthopnea, palpitations, paroxysmal nocturnal dyspnea and syncope.        Visit Vitals  /78 (BP Location: Left arm, Patient Position: Sitting)   Pulse 68   Ht 1.778 m (5' 10\")   Wt 103 kg (227 lb)   BMI 32.57 kg/m²   Smoking Status Never   BSA 2.26 m²     Physical Exam  Vitals and nursing note reviewed.   HENT:      Head: Normocephalic.   Cardiovascular:      Rate and Rhythm: Normal rate and regular rhythm.      Heart sounds: Normal heart sounds.   Pulmonary:      Effort: Pulmonary effort is normal.      Breath sounds: Normal breath sounds.   Abdominal:      Palpations: Abdomen is soft.   Musculoskeletal:      Right lower leg: No edema.      Left lower leg: No edema.   Skin:     General: Skin is warm and dry.   Neurological:      General: No focal deficit present.      Mental Status: She is alert.   Psychiatric:         Mood and Affect: Mood normal.         Behavior: Behavior normal.        Allergies   Allergen Reactions    Carisoprodol Hives, Rash and Unknown    Beta-Blockers (Beta-Adrenergic Blocking Agts) Other     Beta blocker reaction identified as an intolerance per MIKE Malave CNP    Calcium Channel Blocking Agent Diltiazem Analogues Other    Digoxin Other     Current Outpatient Medications   Medication Instructions    acetaminophen (TYLENOL) 500 mg, Every 4 hours PRN    albuterol 90 mcg/actuation inhaler 2 puffs, Every 4 hours PRN    estradiol (ESTRACE) 1 mg, Daily before breakfast    levothyroxine (TIROSINT) 88 mcg, Daily    multivitamin tablet 1 tablet, Daily    olmesartan (BENICAR) 40 mg, oral, Daily    omeprazole (PRILOSEC) 40 mg, 2 times daily before meals    oxybutynin (DITROPAN) 5 mg, Daily    pravastatin (PRAVACHOL) 20 mg, oral, Daily    "   Assessment:    Essential hypertension  Borderline in the office this morning  Reports this is unusual for her  Had meds 30 minutes ago    Coronary artery disease involving native coronary artery of native heart without angina pectoris  mLAD 30%  No significant myocardial bridging as noted on cCTA  CX & RCA luminal irregularities  Microvascular disease  EF 60%  LVEDP 6-8    Current daily activity greater than 4 METS without concerning symptoms      Mixed hyperlipidemia  She was intolerant to high intensity statin due to arthralgia and elevated LFTs.  June 2024 transition to low intensity pravastatin and is tolerating without complaints.  December 2024 AST/ALT normal.    BMI 32.0-32.9,adult  Reviewed the merits of healthy lifestyle choices on overall cardiovascular health.      Cardiac and Vasculature  She was initially seen by Dr. Astorga in the Fall of 2022 for follow up of hospitalization where she ruled out for acute coronary syndrome or acute MI.  November 2022 perfusion study showed no ischemia, and echocardiogram showed LVEF of 65% with no significant valvular heart disease and RVSP of 26 mmHg.      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     June 2024: cCTA suggestive of hemodynamically significant distal LAD disease with myocardial bridging. She was schedule for elective cath but [place] [facility] [place] - r/o ACS/AMI. Cath by Dr. Christina with recommendations for medical management.     Plan:     Through informed decision making process incorporating patients unique circumstances, the following treatment plan will be initiated:    1.  Prescription drug management of cardiovascular medication for efficacy, adherence to treatment, side effect assessment and polypharmacy. Current treatment clinically warranted and to continue without modifications.    2. Return for follow-up; in the interim, contact the office if new symptoms arise.  Dr. Jean-Baptiste as scheduled    Jemma Avery  Ananda MSN, APRN-CNP, PMHNP-[facility] [place]  [place], [place]     Please excuse any errors in grammar or translation related to this dictation. Voice recognition software was utilized to prepare this document.

## 2025-01-06 NOTE — PATIENT INSTRUCTIONS
Please bring all medicines, vitamins, and herbal supplements with you when you come to the office.    Prescriptions will not be filled unless you are compliant with your follow up appointments or have a follow up appointment scheduled as per instruction of your physician. Refills should be requested at the time of your visit.     PLAN:   Through informed decision making process incorporating patients unique circumstances, the following treatment plan will be initiated:    1.  Prescription drug management of cardiovascular medication for efficacy, adherence to treatment, side effect assessment and polypharmacy. Current treatment clinically warranted and to continue without modifications.    2. Return for follow-up; in the interim, contact the office if new symptoms arise.  Dr. Jean-Baptiste as scheduled

## 2025-01-06 NOTE — ASSESSMENT & PLAN NOTE
She was initially seen by Dr. Astorga in the Fall of 2022 for follow up of hospitalization where she ruled out for acute coronary syndrome or acute MI.  November 2022 perfusion study showed no ischemia, and echocardiogram showed LVEF of 65% with no significant valvular heart disease and RVSP of 26 mmHg.      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     June 2024: cCTA suggestive of hemodynamically significant distal LAD disease with myocardial bridging. She was schedule for elective cath but [place] [facility] [place] - r/o ACS/AMI. Cath by Dr. Christina with recommendations for medical management.

## 2025-01-06 NOTE — ASSESSMENT & PLAN NOTE
mLAD 30%  No significant myocardial bridging as noted on cCTA  CX & RCA luminal irregularities  Microvascular disease  EF 60%  LVEDP 6-8    Current daily activity greater than 4 METS without concerning symptoms

## 2025-01-06 NOTE — ASSESSMENT & PLAN NOTE
She was intolerant to high intensity statin due to arthralgia and elevated LFTs.  June 2024 transition to low intensity pravastatin and is tolerating without complaints.  December 2024 AST/ALT normal.

## 2025-05-21 ENCOUNTER — APPOINTMENT (OUTPATIENT)
Dept: CARDIOLOGY | Facility: CLINIC | Age: 65
End: 2025-05-21
Payer: COMMERCIAL

## 2025-05-21 VITALS
HEART RATE: 78 BPM | WEIGHT: 233.8 LBS | DIASTOLIC BLOOD PRESSURE: 95 MMHG | SYSTOLIC BLOOD PRESSURE: 170 MMHG | BODY MASS INDEX: 33.47 KG/M2 | HEIGHT: 70 IN

## 2025-05-21 DIAGNOSIS — I34.0 MODERATE MITRAL REGURGITATION: ICD-10-CM

## 2025-05-21 DIAGNOSIS — E78.2 MIXED HYPERLIPIDEMIA: ICD-10-CM

## 2025-05-21 DIAGNOSIS — I25.10 CORONARY ARTERY DISEASE INVOLVING NATIVE CORONARY ARTERY OF NATIVE HEART WITHOUT ANGINA PECTORIS: ICD-10-CM

## 2025-05-21 DIAGNOSIS — Z78.9 NEVER SMOKED TOBACCO: ICD-10-CM

## 2025-05-21 DIAGNOSIS — I10 ESSENTIAL HYPERTENSION: ICD-10-CM

## 2025-05-21 PROBLEM — I45.6 WPW (WOLFF-PARKINSON-WHITE SYNDROME): Status: ACTIVE | Noted: 2025-05-21

## 2025-05-21 PROCEDURE — 3079F DIAST BP 80-89 MM HG: CPT | Performed by: INTERNAL MEDICINE

## 2025-05-21 PROCEDURE — 3077F SYST BP >= 140 MM HG: CPT | Performed by: INTERNAL MEDICINE

## 2025-05-21 PROCEDURE — 3008F BODY MASS INDEX DOCD: CPT | Performed by: INTERNAL MEDICINE

## 2025-05-21 PROCEDURE — 99214 OFFICE O/P EST MOD 30 MIN: CPT | Performed by: INTERNAL MEDICINE

## 2025-05-21 PROCEDURE — 1036F TOBACCO NON-USER: CPT | Performed by: INTERNAL MEDICINE

## 2025-05-21 RX ORDER — FERROUS SULFATE 325(65) MG
325 TABLET, DELAYED RELEASE (ENTERIC COATED) ORAL AS NEEDED
COMMUNITY

## 2025-05-21 RX ORDER — AMLODIPINE BESYLATE 5 MG/1
5 TABLET ORAL DAILY
Qty: 90 TABLET | Refills: 3 | Status: SHIPPED | OUTPATIENT
Start: 2025-05-21 | End: 2026-05-21

## 2025-05-21 RX ORDER — PREDNISONE 20 MG/1
TABLET ORAL DAILY
COMMUNITY
Start: 2025-05-16

## 2025-05-21 RX ORDER — HYDROXYZINE HYDROCHLORIDE 25 MG/1
25 TABLET, FILM COATED ORAL 4 TIMES DAILY
COMMUNITY
Start: 2025-05-16

## 2025-06-11 DIAGNOSIS — I10 ESSENTIAL HYPERTENSION: ICD-10-CM

## 2025-06-11 RX ORDER — AMLODIPINE BESYLATE 5 MG/1
5 TABLET ORAL DAILY
Qty: 90 TABLET | Refills: 3 | Status: SHIPPED | OUTPATIENT
Start: 2025-06-11 | End: 2026-06-11

## 2026-05-21 ENCOUNTER — APPOINTMENT (OUTPATIENT)
Dept: CARDIOLOGY | Facility: CLINIC | Age: 66
End: 2026-05-21
Payer: COMMERCIAL